# Patient Record
Sex: MALE | Race: WHITE | Employment: UNEMPLOYED | URBAN - METROPOLITAN AREA
[De-identification: names, ages, dates, MRNs, and addresses within clinical notes are randomized per-mention and may not be internally consistent; named-entity substitution may affect disease eponyms.]

---

## 2020-05-05 ENCOUNTER — HOSPITAL ENCOUNTER (OUTPATIENT)
Age: 45
Setting detail: OBSERVATION
Discharge: HOME | End: 2020-05-06
Attending: EMERGENCY MEDICINE | Admitting: SURGERY
Payer: MEDICAID

## 2020-05-05 ENCOUNTER — APPOINTMENT (OUTPATIENT)
Dept: ULTRASOUND IMAGING | Age: 45
End: 2020-05-05
Attending: EMERGENCY MEDICINE
Payer: MEDICAID

## 2020-05-05 ENCOUNTER — APPOINTMENT (OUTPATIENT)
Dept: CT IMAGING | Age: 45
End: 2020-05-05
Attending: EMERGENCY MEDICINE
Payer: MEDICAID

## 2020-05-05 DIAGNOSIS — R74.8 ELEVATED LIPASE: ICD-10-CM

## 2020-05-05 DIAGNOSIS — F10.10 ALCOHOL ABUSE: Primary | ICD-10-CM

## 2020-05-05 DIAGNOSIS — K56.1 INTUSSUSCEPTION (HCC): ICD-10-CM

## 2020-05-05 PROBLEM — R10.9 ABDOMINAL PAIN: Status: ACTIVE | Noted: 2020-05-05

## 2020-05-05 LAB
ALBUMIN SERPL-MCNC: 2.9 G/DL (ref 3.5–5)
ALBUMIN/GLOB SERPL: 0.6 {RATIO} (ref 1.2–3.5)
ALP SERPL-CCNC: 280 U/L (ref 50–136)
ALT SERPL-CCNC: 99 U/L (ref 12–65)
AMPHET UR QL SCN: NEGATIVE
ANION GAP SERPL CALC-SCNC: 11 MMOL/L (ref 7–16)
AST SERPL-CCNC: 193 U/L (ref 15–37)
BARBITURATES UR QL SCN: NEGATIVE
BASOPHILS # BLD: 0.1 K/UL (ref 0–0.2)
BASOPHILS NFR BLD: 2 % (ref 0–2)
BENZODIAZ UR QL: NEGATIVE
BILIRUB SERPL-MCNC: 0.4 MG/DL (ref 0.2–1.1)
BUN SERPL-MCNC: 2 MG/DL (ref 6–23)
CALCIUM SERPL-MCNC: 8.2 MG/DL (ref 8.3–10.4)
CANNABINOIDS UR QL SCN: NEGATIVE
CHLORIDE SERPL-SCNC: 104 MMOL/L (ref 98–107)
CO2 SERPL-SCNC: 25 MMOL/L (ref 21–32)
COCAINE UR QL SCN: NEGATIVE
CREAT SERPL-MCNC: 0.66 MG/DL (ref 0.8–1.5)
DIFFERENTIAL METHOD BLD: ABNORMAL
EOSINOPHIL # BLD: 0.2 K/UL (ref 0–0.8)
EOSINOPHIL NFR BLD: 4 % (ref 0.5–7.8)
ERYTHROCYTE [DISTWIDTH] IN BLOOD BY AUTOMATED COUNT: 24.4 % (ref 11.9–14.6)
ETHANOL SERPL-MCNC: 411 MG/DL
GLOBULIN SER CALC-MCNC: 4.6 G/DL (ref 2.3–3.5)
GLUCOSE SERPL-MCNC: 100 MG/DL (ref 65–100)
HCT VFR BLD AUTO: 36.3 % (ref 41.1–50.3)
HGB BLD-MCNC: 12.1 G/DL (ref 13.6–17.2)
IMM GRANULOCYTES # BLD AUTO: 0 K/UL (ref 0–0.5)
IMM GRANULOCYTES NFR BLD AUTO: 1 % (ref 0–5)
LIPASE SERPL-CCNC: 854 U/L (ref 73–393)
LYMPHOCYTES # BLD: 2.3 K/UL (ref 0.5–4.6)
LYMPHOCYTES NFR BLD: 42 % (ref 13–44)
MCH RBC QN AUTO: 29.1 PG (ref 26.1–32.9)
MCHC RBC AUTO-ENTMCNC: 33.3 G/DL (ref 31.4–35)
MCV RBC AUTO: 87.3 FL (ref 79.6–97.8)
METHADONE UR QL: NEGATIVE
MONOCYTES # BLD: 0.9 K/UL (ref 0.1–1.3)
MONOCYTES NFR BLD: 18 % (ref 4–12)
NEUTS SEG # BLD: 1.8 K/UL (ref 1.7–8.2)
NEUTS SEG NFR BLD: 34 % (ref 43–78)
NRBC # BLD: 0 K/UL (ref 0–0.2)
OPIATES UR QL: NEGATIVE
PCP UR QL: NEGATIVE
PLATELET # BLD AUTO: 261 K/UL (ref 150–450)
PMV BLD AUTO: 11.4 FL (ref 9.4–12.3)
POTASSIUM SERPL-SCNC: 3.9 MMOL/L (ref 3.5–5.1)
PROT SERPL-MCNC: 7.5 G/DL (ref 6.3–8.2)
RBC # BLD AUTO: 4.16 M/UL (ref 4.23–5.6)
SODIUM SERPL-SCNC: 140 MMOL/L (ref 136–145)
WBC # BLD AUTO: 5.3 K/UL (ref 4.3–11.1)

## 2020-05-05 PROCEDURE — 76705 ECHO EXAM OF ABDOMEN: CPT

## 2020-05-05 PROCEDURE — 80053 COMPREHEN METABOLIC PANEL: CPT

## 2020-05-05 PROCEDURE — 81003 URINALYSIS AUTO W/O SCOPE: CPT

## 2020-05-05 PROCEDURE — 96375 TX/PRO/DX INJ NEW DRUG ADDON: CPT

## 2020-05-05 PROCEDURE — 83690 ASSAY OF LIPASE: CPT

## 2020-05-05 PROCEDURE — 74011250637 HC RX REV CODE- 250/637: Performed by: EMERGENCY MEDICINE

## 2020-05-05 PROCEDURE — 80307 DRUG TEST PRSMV CHEM ANLYZR: CPT

## 2020-05-05 PROCEDURE — 74011636320 HC RX REV CODE- 636/320: Performed by: EMERGENCY MEDICINE

## 2020-05-05 PROCEDURE — 74011000258 HC RX REV CODE- 258: Performed by: EMERGENCY MEDICINE

## 2020-05-05 PROCEDURE — 99218 HC RM OBSERVATION: CPT

## 2020-05-05 PROCEDURE — 74177 CT ABD & PELVIS W/CONTRAST: CPT

## 2020-05-05 PROCEDURE — 99285 EMERGENCY DEPT VISIT HI MDM: CPT

## 2020-05-05 PROCEDURE — 74011250636 HC RX REV CODE- 250/636: Performed by: EMERGENCY MEDICINE

## 2020-05-05 PROCEDURE — 74011000250 HC RX REV CODE- 250: Performed by: SURGERY

## 2020-05-05 PROCEDURE — 85025 COMPLETE CBC W/AUTO DIFF WBC: CPT

## 2020-05-05 PROCEDURE — 74011250636 HC RX REV CODE- 250/636: Performed by: SURGERY

## 2020-05-05 PROCEDURE — 96374 THER/PROPH/DIAG INJ IV PUSH: CPT

## 2020-05-05 RX ORDER — SODIUM CHLORIDE 0.9 % (FLUSH) 0.9 %
10 SYRINGE (ML) INJECTION
Status: COMPLETED | OUTPATIENT
Start: 2020-05-05 | End: 2020-05-05

## 2020-05-05 RX ORDER — IBUPROFEN 200 MG
1 TABLET ORAL EVERY 24 HOURS
Status: DISCONTINUED | OUTPATIENT
Start: 2020-05-05 | End: 2020-05-06 | Stop reason: HOSPADM

## 2020-05-05 RX ORDER — ONDANSETRON 2 MG/ML
4 INJECTION INTRAMUSCULAR; INTRAVENOUS
Status: COMPLETED | OUTPATIENT
Start: 2020-05-05 | End: 2020-05-05

## 2020-05-05 RX ORDER — ONDANSETRON 2 MG/ML
4 INJECTION INTRAMUSCULAR; INTRAVENOUS
Status: DISCONTINUED | OUTPATIENT
Start: 2020-05-05 | End: 2020-05-06 | Stop reason: HOSPADM

## 2020-05-05 RX ORDER — LORAZEPAM 2 MG/ML
1 INJECTION INTRAMUSCULAR
Status: DISCONTINUED | OUTPATIENT
Start: 2020-05-05 | End: 2020-05-06 | Stop reason: HOSPADM

## 2020-05-05 RX ORDER — IBUPROFEN 800 MG/1
800 TABLET ORAL
Status: COMPLETED | OUTPATIENT
Start: 2020-05-05 | End: 2020-05-05

## 2020-05-05 RX ADMIN — Medication 10 ML: at 18:56

## 2020-05-05 RX ADMIN — SODIUM CHLORIDE 100 ML: 900 INJECTION, SOLUTION INTRAVENOUS at 18:56

## 2020-05-05 RX ADMIN — SODIUM CHLORIDE 1000 ML: 900 INJECTION, SOLUTION INTRAVENOUS at 15:02

## 2020-05-05 RX ADMIN — IBUPROFEN 800 MG: 800 TABLET, FILM COATED ORAL at 17:29

## 2020-05-05 RX ADMIN — FOLIC ACID: 5 INJECTION, SOLUTION INTRAMUSCULAR; INTRAVENOUS; SUBCUTANEOUS at 23:54

## 2020-05-05 RX ADMIN — ONDANSETRON 4 MG: 2 INJECTION INTRAMUSCULAR; INTRAVENOUS at 15:29

## 2020-05-05 RX ADMIN — DIATRIZOATE MEGLUMINE AND DIATRIZOATE SODIUM 15 ML: 660; 100 LIQUID ORAL; RECTAL at 16:18

## 2020-05-05 RX ADMIN — IOPAMIDOL 100 ML: 755 INJECTION, SOLUTION INTRAVENOUS at 18:56

## 2020-05-05 RX ADMIN — SODIUM CHLORIDE 1000 ML: 900 INJECTION, SOLUTION INTRAVENOUS at 20:16

## 2020-05-05 NOTE — ED PROVIDER NOTES
21-year-old male with history of tobacco use, daily alcohol use presents with complaint of nausea, vomiting, diffuse generalized abdominal discomfort and weight loss over the past 3 months. States he is lost around 30 to 40 pounds in the past 3 to 4 months. Patient states that he drinks around 3-4 beers daily. States he has been unable to eat due to his persistent nausea and vomiting. Patient denies fever, chills, chest pain, shortness of breath, headache, dysuria, hematuria, diarrhea, constipation, cough, melena, hematochezia. Denies any illicit drug use. The history is provided by the patient. No  was used. Weight Loss    Associated symptoms include nausea and vomiting. Pertinent negatives include no fever, no diarrhea, no constipation, no dysuria, no headaches, no myalgias and no chest pain. Vomiting    Associated symptoms include abdominal pain. Pertinent negatives include no chills, no fever, no diarrhea, no headaches, no myalgias, no cough and no headaches. Past Medical History:   Diagnosis Date    Erectile dysfunction        Past Surgical History:   Procedure Laterality Date    HX HEENT  2010    left ear         Family History:   Problem Relation Age of Onset    No Known Problems Mother     No Known Problems Father        Social History     Socioeconomic History    Marital status:      Spouse name: Not on file    Number of children: Not on file    Years of education: Not on file    Highest education level: Not on file   Occupational History    Not on file   Social Needs    Financial resource strain: Not on file    Food insecurity     Worry: Not on file     Inability: Not on file    Transportation needs     Medical: Not on file     Non-medical: Not on file   Tobacco Use    Smoking status: Current Every Day Smoker   Substance and Sexual Activity    Alcohol use:  Yes    Drug use: Not on file    Sexual activity: Not on file   Lifestyle    Physical activity     Days per week: Not on file     Minutes per session: Not on file    Stress: Not on file   Relationships    Social connections     Talks on phone: Not on file     Gets together: Not on file     Attends Jainism service: Not on file     Active member of club or organization: Not on file     Attends meetings of clubs or organizations: Not on file     Relationship status: Not on file    Intimate partner violence     Fear of current or ex partner: Not on file     Emotionally abused: Not on file     Physically abused: Not on file     Forced sexual activity: Not on file   Other Topics Concern    Not on file   Social History Narrative    Not on file         ALLERGIES: Patient has no known allergies. Review of Systems   Constitutional: Positive for fatigue. Negative for chills, diaphoresis and fever. HENT: Negative for congestion, rhinorrhea and sore throat. Respiratory: Negative for cough, shortness of breath and wheezing. Cardiovascular: Negative for chest pain and palpitations. Gastrointestinal: Positive for abdominal pain, nausea and vomiting. Negative for blood in stool, constipation and diarrhea. Genitourinary: Negative for dysuria and flank pain. Musculoskeletal: Negative for myalgias, neck pain and neck stiffness. Skin: Negative for rash and wound. Neurological: Negative for dizziness, syncope, weakness, light-headedness and headaches. Psychiatric/Behavioral: Negative for confusion. Vitals:    05/05/20 1426   BP: 124/85   Pulse: (!) 102   Resp: 18   Temp: 98.4 °F (36.9 °C)   SpO2: 95%   Weight: 54.4 kg (120 lb)   Height: 5' 5\" (1.651 m)            Physical Exam  Vitals signs and nursing note reviewed. Constitutional:       Comments: Thin in appearance. HENT:      Head: Normocephalic. Nose: Nose normal.      Mouth/Throat:      Mouth: Mucous membranes are moist.   Eyes:      Extraocular Movements: Extraocular movements intact.       Pupils: Pupils are equal, round, and reactive to light. Comments: No nystagmus. Cardiovascular:      Rate and Rhythm: Regular rhythm. Tachycardia present. Pulses: Normal pulses. Heart sounds: Normal heart sounds. Comments: Pulses 2+ and equal throughout. Pulmonary:      Effort: Pulmonary effort is normal.      Breath sounds: Normal breath sounds. Comments: CTAB. Abdominal:      Palpations: Abdomen is soft. Tenderness: There is no abdominal tenderness. There is no guarding. Comments: Mild epigastric/right upper quadrant tenderness. No rebound or guarding. No peritoneal signs. No CVA tenderness. Musculoskeletal: Normal range of motion. Skin:     Findings: No erythema or rash. Neurological:      General: No focal deficit present. Mental Status: He is alert and oriented to person, place, and time. Motor: No weakness. Comments: Strength 5 out of 5 throughout. No meningeal signs. No focal deficits. MDM  Number of Diagnoses or Management Options  Alcohol abuse: new and requires workup  Elevated lipase: new and requires workup  Intussusception [facility]): new and requires workup  Diagnosis management comments: 60-year-old male with history of alcohol use and tobacco use presents with complaint of nausea, vomiting, and 30 pound weight loss over the past 3 months. Patient reports mild epigastric/right upper quadrant pain. CBC, CMP, lipase, urinalysis, UDS, ethyl alcohol ordered. 1 L normal saline IV fluid bolus as well as Zofran 4 mg IV ordered. Given patient's right upper quadrant tenderness right upper quadrant ultrasound ordered. Given concerning symptoms of significant weight loss over the past 3 months CT abdomen pelvis with IV and oral contrast ordered.  ===============================================================================  Lipase elevated 854. EtOH level elevated at 411. Slight elevation in AST and ALT.   Right upper quadrant ultrasound with no definitive findings. CT abdomen pelvis obtained with evidence of inflammatory changes to the left abdomen as well as short segment intussusception. Gastroenterology consulted. Recommend general surgery consultation. General surgery consulted. Amount and/or Complexity of Data Reviewed  Clinical lab tests: ordered and reviewed  Tests in the radiology section of CPT®: ordered and reviewed  Tests in the medicine section of CPT®: ordered and reviewed  Review and summarize past medical records: yes  Discuss the patient with other providers: yes  Independent visualization of images, tracings, or specimens: yes    Risk of Complications, Morbidity, and/or Mortality  Presenting problems: moderate  Diagnostic procedures: moderate  Management options: moderate    Patient Progress  Patient progress: stable    ED Course as of May 05 2001   Tue May 05, 2020   1559 Wife contacted ER. States that the patient has been drinking 24 case of beer daily over the past 5 to 6 years. States that he reportedly told her that he quit drinking a year ago. States that at that point in time he began huffing rubbing alcohol and towels. States that she found hidden vodka and liquor bottles in the couch where he was laying today. [DF]   1655 RUQ US IMPRESSION:   1. No gallstones, or additional findings of the gallbladder to suggest  cholecystitis.     2. No intra- or extrahepatic biliary ductal dilitation.     3. Heterogeneous appearance of the pancreas which is incompletely characterized  by ultrasound. Similar changes have been reported with pancreatitis although a  benign process cannot be confirmed by ultrasound. Recommend correlation with  clinical labs and exam. This can be further characterized with a contrasted CT.     4. Fatty infiltration of the liver.       [DF]   1935 CT abd/pelvis IMPRESSION:  Diffuse fatty infiltration throughout the liver. Small cavernous  hemangioma. Chronic inflammatory changes right colon.  Short segment small bowel  intussusception on the left. [DF]   2000 GI consulted. Recommends general surgery consultation. [DF]   2001 General Surgery consulted. [DF]      ED Course User Index  [DF] Hussein Greenwood MD       Procedures    Results Include:    Recent Results (from the past 24 hour(s))   METABOLIC PANEL, COMPREHENSIVE    Collection Time: 05/05/20  2:31 PM   Result Value Ref Range    Sodium 140 136 - 145 mmol/L    Potassium 3.9 3.5 - 5.1 mmol/L    Chloride 104 98 - 107 mmol/L    CO2 25 21 - 32 mmol/L    Anion gap 11 7 - 16 mmol/L    Glucose 100 65 - 100 mg/dL    BUN 2 (L) 6 - 23 MG/DL    Creatinine 0.66 (L) 0.8 - 1.5 MG/DL    GFR est AA >60 >60 ml/min/1.73m2    GFR est non-AA >60 >60 ml/min/1.73m2    Calcium 8.2 (L) 8.3 - 10.4 MG/DL    Bilirubin, total 0.4 0.2 - 1.1 MG/DL    ALT (SGPT) 99 (H) 12 - 65 U/L    AST (SGOT) 193 (H) 15 - 37 U/L    Alk. phosphatase 280 (H) 50 - 136 U/L    Protein, total 7.5 6.3 - 8.2 g/dL    Albumin 2.9 (L) 3.5 - 5.0 g/dL    Globulin 4.6 (H) 2.3 - 3.5 g/dL    A-G Ratio 0.6 (L) 1.2 - 3.5     CBC WITH AUTOMATED DIFF    Collection Time: 05/05/20  2:31 PM   Result Value Ref Range    WBC 5.3 4.3 - 11.1 K/uL    RBC 4.16 (L) 4.23 - 5.6 M/uL    HGB 12.1 (L) 13.6 - 17.2 g/dL    HCT 36.3 (L) 41.1 - 50.3 %    MCV 87.3 79.6 - 97.8 FL    MCH 29.1 26.1 - 32.9 PG    MCHC 33.3 31.4 - 35.0 g/dL    RDW 24.4 (H) 11.9 - 14.6 %    PLATELET 210 865 - 155 K/uL    MPV 11.4 9.4 - 12.3 FL    ABSOLUTE NRBC 0.00 0.0 - 0.2 K/uL    DF AUTOMATED      NEUTROPHILS 34 (L) 43 - 78 %    LYMPHOCYTES 42 13 - 44 %    MONOCYTES 18 (H) 4.0 - 12.0 %    EOSINOPHILS 4 0.5 - 7.8 %    BASOPHILS 2 0.0 - 2.0 %    IMMATURE GRANULOCYTES 1 0.0 - 5.0 %    ABS. NEUTROPHILS 1.8 1.7 - 8.2 K/UL    ABS. LYMPHOCYTES 2.3 0.5 - 4.6 K/UL    ABS. MONOCYTES 0.9 0.1 - 1.3 K/UL    ABS. EOSINOPHILS 0.2 0.0 - 0.8 K/UL    ABS. BASOPHILS 0.1 0.0 - 0.2 K/UL    ABS. IMM.  GRANS. 0.0 0.0 - 0.5 K/UL   ETHYL ALCOHOL    Collection Time: 05/05/20 2:31 PM   Result Value Ref Range    ALCOHOL(ETHYL),SERUM 411 (HH) MG/DL   LIPASE    Collection Time: 05/05/20  2:31 PM   Result Value Ref Range    Lipase 854 (H) 73 - 393 U/L   DRUG SCREEN, URINE    Collection Time: 05/05/20  3:03 PM   Result Value Ref Range    PCP(PHENCYCLIDINE) Negative      BENZODIAZEPINES Negative      COCAINE Negative      AMPHETAMINES Negative      METHADONE Negative      THC (TH-CANNABINOL) Negative      OPIATES Negative      BARBITURATES Negative                  Romulo Song MD; 5/5/2020 @3:34 PM Voice dictation software was used during the making of this note. This software is not perfect and grammatical and other typographical errors may be present.   This note has not been proofread for errors.  ===================================================================

## 2020-05-05 NOTE — ED TRIAGE NOTES
Pt arrives via [facility]. Pt goes outside to smoke a cigarette immediately after arriving before he can be triaged. EMS retrieves pt from curb and places pt in wheelchair. Pt is wearing a mask. EMS reports they were called out for weakness and n/v. EMS reports pt consumes EtOH daily. Pt reports he hasn't been able to eat due to n/v. Pt reports he has lost 40 pounds in the past 3 months. EMS VS: BP [address], . EMS has 18 g IV in right A/C. Pt is a poor historian and rambles when answering questions. Pt can answer all orientation questions correctly, but cannot provide a straight answer for any other question. Pt writhes around in the wheelchair. Pt denies daily drinking. Pt states, \"I used to drink a case of beer a day, but I quit 8 months ago. But I started drinking again to keep me from shaking. \" Pt denies drug use. Pt reports, \"I hurt all over. \"

## 2020-05-06 VITALS
DIASTOLIC BLOOD PRESSURE: 65 MMHG | HEIGHT: 65 IN | OXYGEN SATURATION: 99 % | RESPIRATION RATE: 18 BRPM | BODY MASS INDEX: 19.99 KG/M2 | WEIGHT: 120 LBS | SYSTOLIC BLOOD PRESSURE: 121 MMHG | HEART RATE: 99 BPM | TEMPERATURE: 98.3 F

## 2020-05-06 LAB
ALBUMIN SERPL-MCNC: 2.4 G/DL (ref 3.5–5)
ALBUMIN/GLOB SERPL: 0.6 {RATIO} (ref 1.2–3.5)
ALP SERPL-CCNC: 213 U/L (ref 50–136)
ALT SERPL-CCNC: 76 U/L (ref 12–65)
ANION GAP SERPL CALC-SCNC: 8 MMOL/L (ref 7–16)
AST SERPL-CCNC: 159 U/L (ref 15–37)
BASOPHILS # BLD: 0.1 K/UL (ref 0–0.2)
BASOPHILS NFR BLD: 1 % (ref 0–2)
BILIRUB SERPL-MCNC: 0.7 MG/DL (ref 0.2–1.1)
BUN SERPL-MCNC: 2 MG/DL (ref 6–23)
CALCIUM SERPL-MCNC: 7.6 MG/DL (ref 8.3–10.4)
CHLORIDE SERPL-SCNC: 106 MMOL/L (ref 98–107)
CO2 SERPL-SCNC: 26 MMOL/L (ref 21–32)
CREAT SERPL-MCNC: 0.54 MG/DL (ref 0.8–1.5)
DIFFERENTIAL METHOD BLD: ABNORMAL
EOSINOPHIL # BLD: 0.1 K/UL (ref 0–0.8)
EOSINOPHIL NFR BLD: 2 % (ref 0.5–7.8)
ERYTHROCYTE [DISTWIDTH] IN BLOOD BY AUTOMATED COUNT: 24.3 % (ref 11.9–14.6)
GLOBULIN SER CALC-MCNC: 3.7 G/DL (ref 2.3–3.5)
GLUCOSE SERPL-MCNC: 76 MG/DL (ref 65–100)
HCT VFR BLD AUTO: 30.2 % (ref 41.1–50.3)
HGB BLD-MCNC: 9.9 G/DL (ref 13.6–17.2)
IMM GRANULOCYTES # BLD AUTO: 0 K/UL (ref 0–0.5)
IMM GRANULOCYTES NFR BLD AUTO: 1 % (ref 0–5)
LIPASE SERPL-CCNC: 582 U/L (ref 73–393)
LYMPHOCYTES # BLD: 1.5 K/UL (ref 0.5–4.6)
LYMPHOCYTES NFR BLD: 26 % (ref 13–44)
MCH RBC QN AUTO: 28.9 PG (ref 26.1–32.9)
MCHC RBC AUTO-ENTMCNC: 32.8 G/DL (ref 31.4–35)
MCV RBC AUTO: 88.3 FL (ref 79.6–97.8)
MONOCYTES # BLD: 0.9 K/UL (ref 0.1–1.3)
MONOCYTES NFR BLD: 16 % (ref 4–12)
NEUTS SEG # BLD: 3.1 K/UL (ref 1.7–8.2)
NEUTS SEG NFR BLD: 55 % (ref 43–78)
NRBC # BLD: 0 K/UL (ref 0–0.2)
PLATELET # BLD AUTO: 190 K/UL (ref 150–450)
PMV BLD AUTO: 11.1 FL (ref 9.4–12.3)
POTASSIUM SERPL-SCNC: 3.9 MMOL/L (ref 3.5–5.1)
PROT SERPL-MCNC: 6.1 G/DL (ref 6.3–8.2)
RBC # BLD AUTO: 3.42 M/UL (ref 4.23–5.6)
SODIUM SERPL-SCNC: 140 MMOL/L (ref 136–145)
WBC # BLD AUTO: 5.7 K/UL (ref 4.3–11.1)

## 2020-05-06 PROCEDURE — 99218 HC RM OBSERVATION: CPT

## 2020-05-06 PROCEDURE — 80053 COMPREHEN METABOLIC PANEL: CPT

## 2020-05-06 PROCEDURE — 83690 ASSAY OF LIPASE: CPT

## 2020-05-06 PROCEDURE — 36415 COLL VENOUS BLD VENIPUNCTURE: CPT

## 2020-05-06 PROCEDURE — 85025 COMPLETE CBC W/AUTO DIFF WBC: CPT

## 2020-05-06 PROCEDURE — 74011250636 HC RX REV CODE- 250/636: Performed by: SURGERY

## 2020-05-06 RX ADMIN — SODIUM CHLORIDE 1000 ML: 900 INJECTION, SOLUTION INTRAVENOUS at 05:00

## 2020-05-06 NOTE — ROUTINE PROCESS
Discharge instructions reviewed with patient. Patient verbalized/ signed agreement/ understanding. Paper copy placed in chart. Per patient, his wife is aware of discharge and is in agreement. Patient's wife will transport patient home.

## 2020-05-06 NOTE — DISCHARGE INSTRUCTIONS
Follow up with primary care        Bowel Blockage (Intestinal Obstruction): Care Instructions  Your Care Instructions  A bowel blockage, also called an intestinal obstruction, can prevent gas, fluids, or solids from moving through the intestines normally. It can cause constipation and, rarely, diarrhea. You may have pain, nausea, vomiting, and cramping. Most of the time, complete blockages require a stay in the hospital and possibly surgery. But if your bowel is only partly blocked, your doctor may tell you to wait until it clears on its own and you are able to pass gas and stool. If so, there are things you can do at home to help make you feel better. If you have had surgery for a bowel blockage, there are things you can do at home to make sure you heal well. You can also make some changes to keep your bowel from becoming blocked again. Follow-up care is a key part of your treatment and safety. Be sure to make and go to all appointments, and call your doctor if you are having problems. It's also a good idea to know your test results and keep a list of the medicines you take. How can you care for yourself at home? If your doctor has told you to wait at home for a blockage to clear on its own:  · Follow your doctor's instructions. These may include eating a liquid diet to avoid complete blockage. · Be safe with medicines. Take your medicines exactly as prescribed. Call your doctor if you think you are having a problem with your medicine. · Put a heating pad set on low on your belly to relieve mild cramps and pain. To prevent another blockage  · Try to eat smaller amounts of food more often. For example, have 5 or 6 small meals throughout the day instead of 2 or 3 large meals. · Chew your food very well. Try to chew each bite about 20 times or until it is liquid. · Avoid high-fiber foods and raw fruits and vegetables with skins, husks, strings, or seeds.  These can form a ball of undigested material that can cause a blockage if a part of your bowel is scarred or narrowed. · Check with your doctor before you eat whole-grain products or use a fiber supplement such as Citrucel or Metamucil. · To help you have regular bowel movements, eat at regular times, do not strain during a bowel movement, and drink at least 8 to 10 glasses of water each day. If you have kidney, heart, or liver disease and have to limit fluids, talk with your doctor or before you increase the amount of fluids you drink. · Drink high-calorie liquid formulas if your doctor says to. Severe symptoms may make it hard for your body to take in vitamins and minerals. · Get regular exercise. It helps you digest your food better. Get at least 30 minutes of physical activity on most days of the week. Walking is a good choice. When should you call for help? Call your doctor now or seek immediate medical care if:    · You have a fever.     · You are vomiting.     · You have new or worse belly pain.     · You cannot pass stools or gas.    Watch closely for changes in your health, and be sure to contact your doctor if you have any problems. Where can you learn more? Go to http://jalil-randi.info/  Enter B082 in the search box to learn more about \"Bowel Blockage (Intestinal Obstruction): Care Instructions. \"  Current as of: August 11, 2019Content Version: 12.4  © 9190-8101 [employer], [employer]orporated. Care instructions adapted under license by [employer] (which disclaims liability or warranty for this information). If you have questions about a medical condition or this instruction, always ask your healthcare professional. [place] [address] any warranty or liability for your use of this information.

## 2020-05-06 NOTE — PROGRESS NOTES
END OF SHIFT NOTE:    INTAKE/OUTPUT  05/05 0701 - 05/06 0700  In: 2891 [I.V.:2222]  Out: 700 [Urine:700]  Voiding: YES  Catheter: NO  Drain:              Flatus: Patient does not have flatus present. Stool:  0 occurrences. Characteristics:       Emesis: 0 occurrences. Characteristics:        VITAL SIGNS  Patient Vitals for the past 12 hrs:   Temp Pulse Resp BP SpO2   05/06/20 0355 98.6 °F (37 °C) 93 18 126/86 96 %   05/05/20 2244 98.6 °F (37 °C) 78 18 109/71 95 %   05/05/20 2129 98.7 °F (37.1 °C) 100 18 121/76 98 %       Pain Assessment  Pain Intensity 1: 0 (05/06/20 0125)        Patient Stated Pain Goal: 0    Ambulating  Yes    Shift report given to oncoming nurse at the bedside.     [address]

## 2020-05-06 NOTE — PROGRESS NOTES
Luis Armando Washburn TRANSFER - IN REPORT:    Verbal report received from Dary RN on Allison Rizzo  being received from ED(unit) for routine progression of care      Report consisted of patients Situation, Background, Assessment and   Recommendations(SBAR). Information from the following report(s) SBAR, ED Summary, [facility] and Recent Results was reviewed with the receiving nurse. Opportunity for questions and clarification was provided. Assessment will be completed upon patients arrival to unit and care assumed.

## 2020-05-06 NOTE — PROGRESS NOTES
Jyoti Coates  Admission Date: 5/5/2020   POD: * No surgery found *            Daily Progress Note: 5/6/2020  Subjective:   Abdominal pain has resolved. Pt is hungry   Objective:   AVSS  Physical Exam:          GEN: well developed and in no acute distress  HEENT:  PERRL, EOMI, no alar flaring or epistaxis, oral mucosa moist without cyanosis,   NECK:  no JVD, no retractions, no thyromegaly or masses  LUNGS:  clear  HEART:  RRR  ABDOMEN:  soft with no tenderness; positive bowel sounds present  EXTREMITIES:  warm with no cyanosis,  SKIN:  no jaundice or ecchymosis,   NEURO:  alert and oriented, grossly non-focal      LAB  Recent Labs     05/06/20  0422 05/05/20  1431   WBC 5.7 5.3   HGB 9.9* 12.1*   HCT 30.2* 36.3*    261    140   K 3.9 3.9    104   CO2 26 25   BUN 2* 2*   CREA 0.54* 0.66*   GLU 76 100   TBILI 0.7 0.4   SGOT 159* 193*   ALT 76* 99*   * 280*   LPSE 582* 854*       Assessment:     Hospital Problems  Date Reviewed: 11/13/2015          Codes Class Noted POA    Abdominal pain ICD-10-CM: R10.9  ICD-9-CM: 789.00  5/5/2020 Unknown            Plan:    On further review of CT scan there is no convincing evidence of intussusception - likely gastroenteritis due to excessive drinking   Regular diet  Discharge home  Follow up with primary care   Sarah Bradshaw MD

## 2020-05-06 NOTE — H&P
[address], [address] [facility]  [phone]     History and Physical/Surgical Consult   Jyoti Coates      MRN: [id number]     : 1975     Age: 38 y.o.          2020 8:13 AM    Subjective/HPI:   This patient is a 38 y.o. seen and evaluated at the request of Dr. Dario Purdy in ED. Pt admitted for obs due to possible small bowel intussusception seen on CT scan and abdominal pain. Pt drunk on admission    Review of Systems  A comprehensive review of systems was negative except for that written in the HPI. Past Medical History:   Diagnosis Date    Erectile dysfunction       Past Surgical History:   Procedure Laterality Date    HX HEENT      left ear      No Known Allergies   Social History     Tobacco Use    Smoking status: Current Every Day Smoker   Substance Use Topics    Alcohol use: Yes      Social History     Social History Narrative    Not on file     Family History   Problem Relation Age of Onset    No Known Problems Mother     No Known Problems Father       None     Current Facility-Administered Medications   Medication Dose Route Frequency    nicotine (NICODERM CQ) 14 mg/24 hr patch 1 Patch  1 Patch TransDERmal Q24H    sodium chloride 0.9 % bolus infusion 1,000 mL  1,000 mL IntraVENous CONTINUOUS    LORazepam (ATIVAN) injection 1 mg  1 mg IntraVENous Q4H PRN    ondansetron (ZOFRAN) injection 4 mg  4 mg IntraVENous Q4H PRN     Objective:     Vitals:    20 2129 20 2244 20 0355 20 0730   BP: 121/76 109/71 126/86 121/65   Pulse: 100 78 93 99   Resp: 18 18 18 18   Temp: 98.7 °F (37.1 °C) 98.6 °F (37 °C) 98.6 °F (37 °C) 98.3 °F (36.8 °C)   SpO2: 98% 95% 96% 99%   Weight:       Height:           Physical Exam:   Gen- drunk - history limited.     HEENT- PERRL, EOMI, no scleral icterus       nose without alar flaring or epistaxis                  oral muscosa moist without cyanosis  Neck- no JVD or retractions  Lungs- resp even/unlab   Heart- RRR   Abd- moderately firm and tender to palpation  Ext- warm without cyanosis. There is nolower leg edema. Skin- no jaundice or rashes     Data Review   CT Results (most recent):  Results from Hospital Encounter encounter on 05/05/20   CT ABD PELV W CONT    Narrative CT ABDOMEN AND PELVIS WITH CONTRAST. HISTORY: Nausea and vomiting. COMPARISON: None    TECHNIQUE: 5 mm axial scans from above the diaphragms to the pubic symphysis  following oral and 100 cc intravenous contrast without acute complication. Intravenous contrast was given to increase the sensitivity to acute  inflammation. Radiation dose reduction techniques were used for this study. Our CT scanners use one or more of the following: Automated exposure control,  adjustment of the mA and or kV according to patient size, iterative  reconstruction. FINDINGS:   -Lung Bases: The lung bases are clear.    -Liver: Diffuse fatty infiltration. Cavernous hemangioma posterior segment right  lobe. -Gallbladder/Bile Ducts: No gallstones or bile duct dilation.  -Pancreas: Unremarkable.  -Spleen: Uniform and normal size.    -Stomach: Unremarkable. -Bowel: Normal caliber. No inflammatory changes. Short segment small bowel  intussusception on the left. Fatty stratification in the right colon.    -Kidneys/Ureters: Enhance symmetrically. No hydronephrosis. -Urinary Bladder: Unremarkable.  -Adrenals: Are normal size. -Reproductive Organs: Unremarkable.    -Lymph Nodes: No grossly enlarged retroperitoneal, mesenteric, or pelvic  adenopathy.  -Vasculature: Aorta is normal caliber.  -Bones: No gross bony lesions.    -Other: No ascites. Impression IMPRESSION:  Diffuse fatty infiltration throughout the liver. Small cavernous  hemangioma. Chronic inflammatory changes right colon. Short segment small bowel  intussusception on the left.      Lab Results   Component Value Date/Time    Sodium 140 05/06/2020 04:22 AM    Potassium 3.9 05/06/2020 04:22 AM    Chloride 106 05/06/2020 04:22 AM    CO2 26 05/06/2020 04:22 AM    Anion gap 8 05/06/2020 04:22 AM    Glucose 76 05/06/2020 04:22 AM    BUN 2 (L) 05/06/2020 04:22 AM    Creatinine 0.54 (L) 05/06/2020 04:22 AM    GFR est AA >60 05/06/2020 04:22 AM    GFR est non-AA >60 05/06/2020 04:22 AM    Calcium 7.6 (L) 05/06/2020 04:22 AM     Lab Results   Component Value Date/Time    ALT (SGPT) 76 (H) 05/06/2020 04:22 AM    AST (SGOT) 159 (H) 05/06/2020 04:22 AM    Alk. phosphatase 213 (H) 05/06/2020 04:22 AM    Bilirubin, total 0.7 05/06/2020 04:22 AM     Lab Results   Component Value Date/Time    WBC 5.7 05/06/2020 04:22 AM    HGB 9.9 (L) 05/06/2020 04:22 AM    HCT 30.2 (L) 05/06/2020 04:22 AM    PLATELET 227 28/56/5538 04:22 AM    MCV 88.3 05/06/2020 04:22 AM         Assessment:     Patient Active Problem List   Diagnosis Code    Erectile dysfunction N52.9    Abdominal pain R10.9       Plan:     Admitted overnight for obs.  Due to possibility of small bowel obstruction caused by  intussusception   DT protocol       Erica Bucio MD

## 2020-05-06 NOTE — PROGRESS NOTES
Chart screened by  for discharge planning. No needs identified at this time. Please consult  if any new issues arise.     Discharge Location  Discharge Placement: Home

## 2022-03-18 PROBLEM — R10.9 ABDOMINAL PAIN: Status: ACTIVE | Noted: 2020-05-05

## 2024-05-30 ENCOUNTER — TELEPHONE (OUTPATIENT)
Dept: ORTHOPEDIC SURGERY | Age: 49
End: 2024-05-30

## 2024-05-30 NOTE — TELEPHONE ENCOUNTER
Called pt and requested he call the office back. Wanted to let him know I spoke with MET's MA (JOSÉ MIGUEL) and MET is willing to see him at his [address] but he recommends that pt call PCP for a referral to neurology as well.

## 2024-06-10 ENCOUNTER — OFFICE VISIT (OUTPATIENT)
Dept: ORTHOPEDIC SURGERY | Age: 49
End: 2024-06-10
Payer: MEDICAID

## 2024-06-10 DIAGNOSIS — M79.672 LEFT FOOT PAIN: Primary | ICD-10-CM

## 2024-06-10 DIAGNOSIS — G57.32 NEUROPATHY OF LEFT PERONEAL NERVE: ICD-10-CM

## 2024-06-10 DIAGNOSIS — R29.898 WEAKNESS OF LEFT LOWER EXTREMITY: ICD-10-CM

## 2024-06-10 DIAGNOSIS — G62.9 SENSORY MOTOR NEUROPATHY: ICD-10-CM

## 2024-06-10 PROCEDURE — 99204 OFFICE O/P NEW MOD 45 MIN: CPT | Performed by: ORTHOPAEDIC SURGERY

## 2024-06-10 PROCEDURE — M5017 MISC EVENUP: HCPCS | Performed by: ORTHOPAEDIC SURGERY

## 2024-06-10 PROCEDURE — L4360 PNEUMAT WALKING BOOT PRE CST: HCPCS | Performed by: ORTHOPAEDIC SURGERY

## 2024-06-10 RX ORDER — GABAPENTIN 100 MG/1
CAPSULE ORAL
Qty: 90 CAPSULE | Refills: 2 | Status: SHIPPED | OUTPATIENT
Start: 2024-06-10 | End: 2024-09-10

## 2024-06-10 NOTE — PROGRESS NOTES
Name: Blayne Shankar  YOB: 1975  Gender: male  MRN: [id number]    CC: Left foot    HPI:   May 2024: Woke up 1 morning with inability to move his big toe; no reported trauma; tingling numbness burning dorsal medial foot  06/10/2024: Initial visit: Left ankle/foot    ROS/Meds/PSH/PMH/FH/SH: reviewed today    Tobacco:  reports that he has been smoking. He does not have any smokeless tobacco history on file.     Physical Examination:  Patient appears to be alert and oriented with acceptable appearance.  No obvious distress or SOB  CV: appears to have acceptable vascular color and capillary refill  Neuro: appears to have diminished light touch sensation DPN > SPN; intact appearing tibial nerve  Skin: Left = no gross soft tissue swelling; no redness; dorsal great toe MTP hyperpigmentation; no open lesion  MS: Standing: Plantigrade: Gait circumduction left  Left = anterior/lateral compartment positive Tinel's  Left = anterior/lateral compartments are soft; no palpable mass; no compartment syndrome   Left = no reproducible knee ankle or foot pain  Left = anterior compartment weakness 3/5 strength  Left = lateral compartment weakness 4-/5  Left = posterior compartment full 5/5 strength    XR: Left: Standing AP lateral mortise ankle plus AP oblique foot taken today with no acute ankle or foot pathology appreciated; moderate arthritis  XR Impression:  As above      Reviewed Test/Records/Documents: Nothing related to his foot or ankle  07/05/2020: [facility] health: Reflects concerns related to EtOH and chronic shoulder pain and pinched nerve:    Assessment:    Left peroneal motor/sensory neuropathy    Plan:   The patient and I discussed the above assessment. We explored treatment options.     He has motor and sensory neuropathy - anterior and lateral compartments   He reports prior spine injury with LBP only associated with picking up very heavy objects  He denies radiculopathy or sciatic concerns  He denies any

## 2024-06-10 NOTE — PROGRESS NOTES
Patient was fitted and instructed on an Even Up for the right foot.  The patient was prescribed a walker boot for the patient's left foot. The patient wears a size 7.5 shoe and I fitted them with a S size boot. The patient was fitted and instructed on the use of prescribed walker boot. I explained how to fit themselves and that the plastic flexible piece should always be on the front of the boot and secured by the Velcro straps on top. The air bladder in the boot was adjusted according to proper fit and comfort. The patient walked a short distance and acknowledged satisfaction with current fit. I also explained that they need a heel lift or a higher heeled shoe for the uninvolved LE to help normalize gait and avoid excessive low back stress/strain due to leg length inequality created from walker boot.Patient read and signed documenting they understand and agree to [facility] current DME return policy.

## 2024-06-21 ENCOUNTER — CLINICAL DOCUMENTATION (OUTPATIENT)
Dept: ORTHOPEDIC SURGERY | Age: 49
End: 2024-06-21

## 2024-07-01 ENCOUNTER — TELEPHONE (OUTPATIENT)
Dept: ORTHOPEDIC SURGERY | Age: 49
End: 2024-07-01

## 2024-07-01 NOTE — TELEPHONE ENCOUNTER
Called and left message for pt about rescheduling his miss appointment with Dr. Ferro. Pt is to return call to the office to be rescheduled.